# Patient Record
Sex: FEMALE | Race: WHITE | NOT HISPANIC OR LATINO | URBAN - METROPOLITAN AREA
[De-identification: names, ages, dates, MRNs, and addresses within clinical notes are randomized per-mention and may not be internally consistent; named-entity substitution may affect disease eponyms.]

---

## 2023-03-16 ENCOUNTER — NURSING HOME VISIT (OUTPATIENT)
Dept: POST ACUTE CARE | Facility: EXTERNAL LOCATION | Age: 88
End: 2023-03-16
Payer: MEDICARE

## 2023-03-16 ENCOUNTER — NURSING HOME VISIT (OUTPATIENT)
Dept: POST ACUTE CARE | Facility: EXTERNAL LOCATION | Age: 88
End: 2023-03-16

## 2023-03-16 DIAGNOSIS — I10 BENIGN HYPERTENSION: ICD-10-CM

## 2023-03-16 DIAGNOSIS — G40.909 SEIZURE DISORDER (MULTI): ICD-10-CM

## 2023-03-16 DIAGNOSIS — F03.918 DEMENTIA WITH BEHAVIORAL PROBLEM (MULTI): ICD-10-CM

## 2023-03-16 DIAGNOSIS — G47.01 INSOMNIA DUE TO MEDICAL CONDITION: ICD-10-CM

## 2023-03-16 DIAGNOSIS — I10 ESSENTIAL HYPERTENSION, BENIGN: ICD-10-CM

## 2023-03-16 DIAGNOSIS — G47.00 PERSISTENT DISORDER OF INITIATING OR MAINTAINING SLEEP: ICD-10-CM

## 2023-03-16 DIAGNOSIS — G40.89: ICD-10-CM

## 2023-03-16 PROCEDURE — 99308 SBSQ NF CARE LOW MDM 20: CPT | Performed by: FAMILY MEDICINE

## 2023-03-16 NOTE — LETTER
Patient: Bronwyn Freeman  : 3/29/1928    Encounter Date: 2023    SEE SCANNED NOTE    Electronically Signed By: Bonnie Fallon DO   3/21/23  2:55 PM

## 2023-03-16 NOTE — LETTER
Patient: Bronwyn Freeman  : 3/29/1928    Encounter Date: 2023    SEE SCANNED NOTE    Electronically Signed By: Bonnie Fallon DO   3/21/23  2:50 PM

## 2023-05-25 ENCOUNTER — NURSING HOME VISIT (OUTPATIENT)
Dept: POST ACUTE CARE | Facility: EXTERNAL LOCATION | Age: 88
End: 2023-05-25
Payer: MEDICARE

## 2023-05-25 DIAGNOSIS — F03.918 DEMENTIA WITH BEHAVIORAL PROBLEM (MULTI): ICD-10-CM

## 2023-05-25 DIAGNOSIS — I10 ESSENTIAL HYPERTENSION, BENIGN: ICD-10-CM

## 2023-05-25 DIAGNOSIS — G47.00 PERSISTENT DISORDER OF INITIATING OR MAINTAINING SLEEP: ICD-10-CM

## 2023-05-25 PROCEDURE — 99308 SBSQ NF CARE LOW MDM 20: CPT | Performed by: FAMILY MEDICINE

## 2023-05-25 NOTE — LETTER
Patient: Bronwyn Freeman  : 3/29/1928    Encounter Date: 2023    SEE SCANNED NOTE      Electronically Signed By: Bonnie Fallon DO   23  8:57 AM

## 2023-05-30 PROBLEM — G47.00 PERSISTENT DISORDER OF INITIATING OR MAINTAINING SLEEP: Status: ACTIVE | Noted: 2023-05-30

## 2023-05-30 PROBLEM — I10 ESSENTIAL HYPERTENSION, BENIGN: Status: ACTIVE | Noted: 2023-05-30

## 2023-05-30 PROBLEM — F03.918 DEMENTIA WITH BEHAVIORAL PROBLEM (MULTI): Status: ACTIVE | Noted: 2023-05-30

## 2023-06-22 ENCOUNTER — NURSING HOME VISIT (OUTPATIENT)
Dept: POST ACUTE CARE | Facility: EXTERNAL LOCATION | Age: 88
End: 2023-06-22
Payer: MEDICARE

## 2023-06-22 DIAGNOSIS — I10 ESSENTIAL HYPERTENSION, BENIGN: ICD-10-CM

## 2023-06-22 DIAGNOSIS — G47.00 PERSISTENT DISORDER OF INITIATING OR MAINTAINING SLEEP: ICD-10-CM

## 2023-06-22 DIAGNOSIS — F03.918 DEMENTIA WITH BEHAVIORAL PROBLEM (MULTI): ICD-10-CM

## 2023-06-22 PROCEDURE — 99308 SBSQ NF CARE LOW MDM 20: CPT | Performed by: FAMILY MEDICINE

## 2023-06-22 NOTE — LETTER
Patient: Bronwyn Freeman  : 3/29/1928    Encounter Date: 2023    SEE SCANNED NOTE    Electronically Signed By: Bonnie Fallon DO   23  8:34 AM

## 2023-07-20 ENCOUNTER — NURSING HOME VISIT (OUTPATIENT)
Dept: POST ACUTE CARE | Facility: EXTERNAL LOCATION | Age: 88
End: 2023-07-20
Payer: MEDICARE

## 2023-07-20 DIAGNOSIS — F03.918 DEMENTIA WITH BEHAVIORAL PROBLEM (MULTI): ICD-10-CM

## 2023-07-20 PROCEDURE — 99308 SBSQ NF CARE LOW MDM 20: CPT | Performed by: FAMILY MEDICINE

## 2023-07-20 NOTE — LETTER
Patient: Bronwyn Freeman  : 3/29/1928    Encounter Date: 2023    SEE SCANNED NOTE      Electronically Signed By: Bonnie Fallon DO   23  8:12 AM

## 2024-03-29 ENCOUNTER — NURSING HOME VISIT (OUTPATIENT)
Dept: POST ACUTE CARE | Facility: EXTERNAL LOCATION | Age: 89
End: 2024-03-29
Payer: MEDICARE

## 2024-03-29 DIAGNOSIS — F03.918 DEMENTIA WITH BEHAVIORAL PROBLEM (MULTI): ICD-10-CM

## 2024-03-29 DIAGNOSIS — I10 ESSENTIAL HYPERTENSION, BENIGN: ICD-10-CM

## 2024-03-29 PROCEDURE — 99308 SBSQ NF CARE LOW MDM 20: CPT | Performed by: FAMILY MEDICINE

## 2024-03-29 NOTE — LETTER
Patient: Bronwyn Freeman  : 3/29/1928    Encounter Date: 2024    SEE SCANNED NOTE    Electronically Signed By: Bonnie Fallon DO   3/29/24 10:24 AM

## 2024-04-11 ENCOUNTER — NURSING HOME VISIT (OUTPATIENT)
Dept: POST ACUTE CARE | Facility: EXTERNAL LOCATION | Age: 89
End: 2024-04-11
Payer: MEDICARE

## 2024-04-11 DIAGNOSIS — I10 ESSENTIAL HYPERTENSION, BENIGN: ICD-10-CM

## 2024-04-11 DIAGNOSIS — F03.918 DEMENTIA WITH BEHAVIORAL PROBLEM (MULTI): ICD-10-CM

## 2024-04-11 DIAGNOSIS — B02.23 SHINGLES (HERPES ZOSTER) POLYNEUROPATHY: ICD-10-CM

## 2024-04-11 PROCEDURE — 99308 SBSQ NF CARE LOW MDM 20: CPT | Performed by: FAMILY MEDICINE

## 2024-04-11 NOTE — LETTER
Patient: Bronwyn Freeman  : 3/29/1928    Encounter Date: 2024    SEE SCANNED NOTE      Electronically Signed By: Bonnie Fallon DO   24  9:36 AM

## 2024-05-23 ENCOUNTER — NURSING HOME VISIT (OUTPATIENT)
Dept: POST ACUTE CARE | Facility: EXTERNAL LOCATION | Age: 89
End: 2024-05-23
Payer: MEDICARE

## 2024-05-23 DIAGNOSIS — F03.918 DEMENTIA WITH BEHAVIORAL PROBLEM (MULTI): Primary | ICD-10-CM

## 2024-05-23 DIAGNOSIS — I10 ESSENTIAL HYPERTENSION, BENIGN: ICD-10-CM

## 2024-05-23 PROCEDURE — 99308 SBSQ NF CARE LOW MDM 20: CPT | Performed by: FAMILY MEDICINE

## 2024-05-23 NOTE — LETTER
Patient: Bronwyn Freeman  : 3/29/1928    Encounter Date: 2024    SEE SCANNED NOTE      Electronically Signed By: Bonnie Fallon DO   24  9:03 AM

## 2024-06-20 ENCOUNTER — NURSING HOME VISIT (OUTPATIENT)
Dept: POST ACUTE CARE | Facility: EXTERNAL LOCATION | Age: 89
End: 2024-06-20
Payer: MEDICARE

## 2024-06-20 DIAGNOSIS — F03.918 DEMENTIA WITH BEHAVIORAL PROBLEM (MULTI): ICD-10-CM

## 2024-06-20 DIAGNOSIS — I10 ESSENTIAL HYPERTENSION, BENIGN: ICD-10-CM

## 2024-06-20 PROCEDURE — 99308 SBSQ NF CARE LOW MDM 20: CPT | Performed by: FAMILY MEDICINE

## 2024-06-20 NOTE — LETTER
Patient: Bronwyn Freeman  : 3/29/1928    Encounter Date: 2024    SEE SCANNED NOTE      Electronically Signed By: Bonnie Fallon DO   24 11:26 AM

## 2024-07-17 ENCOUNTER — NURSING HOME VISIT (OUTPATIENT)
Dept: POST ACUTE CARE | Facility: EXTERNAL LOCATION | Age: 89
End: 2024-07-17
Payer: MEDICARE

## 2024-07-17 DIAGNOSIS — F03.918 DEMENTIA WITH BEHAVIORAL PROBLEM (MULTI): ICD-10-CM

## 2024-07-17 DIAGNOSIS — I10 ESSENTIAL HYPERTENSION, BENIGN: ICD-10-CM

## 2024-07-17 PROCEDURE — 99308 SBSQ NF CARE LOW MDM 20: CPT | Performed by: FAMILY MEDICINE

## 2024-07-17 NOTE — LETTER
Patient: Bronwyn Freeman  : 3/29/1928    Encounter Date: 2024    SEE SCANNED NOTE      Electronically Signed By: Bonnie Fallon DO   24  9:53 AM

## 2024-08-15 ENCOUNTER — NURSING HOME VISIT (OUTPATIENT)
Dept: POST ACUTE CARE | Facility: EXTERNAL LOCATION | Age: 89
End: 2024-08-15
Payer: MEDICARE

## 2024-08-15 DIAGNOSIS — F03.918 DEMENTIA WITH BEHAVIORAL PROBLEM (MULTI): ICD-10-CM

## 2024-08-15 DIAGNOSIS — I10 ESSENTIAL HYPERTENSION, BENIGN: ICD-10-CM

## 2024-08-15 PROCEDURE — 99310 SBSQ NF CARE HIGH MDM 45: CPT | Performed by: FAMILY MEDICINE

## 2024-08-15 NOTE — LETTER
Patient: Bronwyn Freeman  : 3/29/1928    Encounter Date: 08/15/2024    SEE SCANNED NOTE      Electronically Signed By: Bonnie Fallon DO   24  8:55 AM

## 2024-10-17 PROCEDURE — 99308 SBSQ NF CARE LOW MDM 20: CPT | Performed by: FAMILY MEDICINE

## 2024-11-08 PROCEDURE — 99308 SBSQ NF CARE LOW MDM 20: CPT | Performed by: FAMILY MEDICINE

## 2024-11-09 PROCEDURE — 99308 SBSQ NF CARE LOW MDM 20: CPT | Performed by: FAMILY MEDICINE

## 2024-11-14 PROCEDURE — 99308 SBSQ NF CARE LOW MDM 20: CPT | Performed by: FAMILY MEDICINE

## 2024-11-14 NOTE — PROGRESS NOTES
SPOKE WITH NURSES AT [facility]   PATIENT CONTINUES TO BE CONFUSED /BETTER TODAY PER NURSING   WILL SPEAK WITH HER DAUGHTER ABOUT PLACING HER IN HOSPICE CARE   SHE HAS DEMENTIA AND WORSENING AMS

## 2024-12-05 ENCOUNTER — NURSING HOME VISIT (OUTPATIENT)
Dept: POST ACUTE CARE | Facility: EXTERNAL LOCATION | Age: 89
End: 2024-12-05
Payer: MEDICARE

## 2024-12-05 ENCOUNTER — NURSING HOME VISIT (OUTPATIENT)
Dept: PRIMARY CARE | Facility: CLINIC | Age: 89
End: 2024-12-05
Payer: MEDICARE

## 2024-12-05 DIAGNOSIS — I10 ESSENTIAL HYPERTENSION, BENIGN: ICD-10-CM

## 2024-12-05 DIAGNOSIS — F03.918 DEMENTIA WITH BEHAVIORAL PROBLEM (MULTI): Primary | ICD-10-CM

## 2024-12-05 DIAGNOSIS — G47.00 PERSISTENT DISORDER OF INITIATING OR MAINTAINING SLEEP: ICD-10-CM

## 2024-12-05 PROCEDURE — 99309 SBSQ NF CARE MODERATE MDM 30: CPT | Performed by: FAMILY MEDICINE

## 2024-12-05 ASSESSMENT — PAIN SCALES - GENERAL: PAINLEVEL_OUTOF10: 0-NO PAIN

## 2024-12-05 NOTE — LETTER
Patient: Bronwyn Freeman  : 3/29/1928    Encounter Date: 2024    Visit  Note   Subjective  Bronwyn Freeman is a 89 y.o. female who is being seen at [facility] and evaluated for multiple medical problems. Nursing notes, vital signs, and labs were reviewed in the local facility chart.  No chief complaint on file.     HPI PATIENT IS VERY DEAF  SHE IS SMILING AND SHE RECOGNIZES ME   HAS NO COMPLAINTS   HERE FOR ROUTINE ROUNDS     Objective  /72   Pulse 72   Temp 36.5 °C (97.7 °F) (Oral)   Resp 16   Wt 64.9 kg (143 lb)   SpO2 96%   Physical Exam  Vitals and nursing note reviewed.   Constitutional:       Appearance: Normal appearance.   HENT:      Head: Normocephalic.   Cardiovascular:      Rate and Rhythm: Normal rate and regular rhythm.      Pulses: Normal pulses.      Heart sounds: Normal heart sounds. No murmur heard.     No friction rub. No gallop.   Pulmonary:      Effort: Pulmonary effort is normal. No respiratory distress.      Breath sounds: Normal breath sounds. No wheezing.   Abdominal:      General: There is no distension.      Palpations: Abdomen is soft.      Tenderness: There is no abdominal tenderness.   Musculoskeletal:         General: No deformity.      Comments: VERY SEDENTARY AND AMBULATORY DYSFUNCTION AND DECONDITIONING    Skin:     General: Skin is warm and dry.      Capillary Refill: Capillary refill takes less than 2 seconds.   Neurological:      General: No focal deficit present.      Mental Status: She is alert.      Comments: ORIENTED TO HER PLACE AND SOME FACIAL RECOGNITION      Psychiatric:         Mood and Affect: Mood normal.     Assessment/Plan  Assessment & Plan  Dementia with behavioral problem (Multi)         Essential hypertension, benign         Persistent disorder of initiating or maintaining sleep                Please excuse any errors in grammar or translation related to this dictation. Voice recognition software was utilized to prepare this  document.       Electronically Signed By: Bonnie Fallon DO   12/16/24  1:07 PM

## 2024-12-16 VITALS
SYSTOLIC BLOOD PRESSURE: 122 MMHG | TEMPERATURE: 97.7 F | HEART RATE: 72 BPM | DIASTOLIC BLOOD PRESSURE: 72 MMHG | OXYGEN SATURATION: 96 % | RESPIRATION RATE: 16 BRPM | WEIGHT: 143 LBS

## 2024-12-16 NOTE — PROGRESS NOTES
Visit  Note   Subjective   Bronwyn Freeman is a 89 y.o. female who is being seen at [facility] and evaluated for multiple medical problems. Nursing notes, vital signs, and labs were reviewed in the local facility chart.  No chief complaint on file.     HPI PATIENT IS VERY DEAF  SHE IS SMILING AND SHE RECOGNIZES ME   HAS NO COMPLAINTS   HERE FOR ROUTINE ROUNDS     Objective   /72   Pulse 72   Temp 36.5 °C (97.7 °F) (Oral)   Resp 16   Wt 64.9 kg (143 lb)   SpO2 96%   Physical Exam  Vitals and nursing note reviewed.   Constitutional:       Appearance: Normal appearance.   HENT:      Head: Normocephalic.   Cardiovascular:      Rate and Rhythm: Normal rate and regular rhythm.      Pulses: Normal pulses.      Heart sounds: Normal heart sounds. No murmur heard.     No friction rub. No gallop.   Pulmonary:      Effort: Pulmonary effort is normal. No respiratory distress.      Breath sounds: Normal breath sounds. No wheezing.   Abdominal:      General: There is no distension.      Palpations: Abdomen is soft.      Tenderness: There is no abdominal tenderness.   Musculoskeletal:         General: No deformity.      Comments: VERY SEDENTARY AND AMBULATORY DYSFUNCTION AND DECONDITIONING    Skin:     General: Skin is warm and dry.      Capillary Refill: Capillary refill takes less than 2 seconds.   Neurological:      General: No focal deficit present.      Mental Status: She is alert.      Comments: ORIENTED TO HER PLACE AND SOME FACIAL RECOGNITION      Psychiatric:         Mood and Affect: Mood normal.     Assessment/Plan   Assessment & Plan  Dementia with behavioral problem (Multi)         Essential hypertension, benign         Persistent disorder of initiating or maintaining sleep                Please excuse any errors in grammar or translation related to this dictation. Voice recognition software was utilized to prepare this document.

## 2024-12-31 NOTE — PROGRESS NOTES
Visit  Note   Subjective   Bronwyn Freeman is a 89 y.o. female who is being seen at [facility] and evaluated for multiple medical problems. Nursing notes, vital signs, and labs were reviewed in the local facility chart.  No chief complaint on file.     HPI     Objective   There were no vitals taken for this visit.  Physical Exam  Assessment/Plan   Assessment & Plan  Dementia with behavioral problem (Multi)         Essential hypertension, benign                Please excuse any errors in grammar or translation related to this dictation. Voice recognition software was utilized to prepare this document.

## 2025-01-20 ENCOUNTER — NURSING HOME VISIT (OUTPATIENT)
Dept: PRIMARY CARE | Facility: CLINIC | Age: OVER 89
End: 2025-01-20
Payer: MEDICARE

## 2025-01-20 VITALS
TEMPERATURE: 97.7 F | DIASTOLIC BLOOD PRESSURE: 68 MMHG | SYSTOLIC BLOOD PRESSURE: 120 MMHG | HEART RATE: 68 BPM | WEIGHT: 144 LBS | RESPIRATION RATE: 16 BRPM | OXYGEN SATURATION: 98 %

## 2025-01-20 DIAGNOSIS — F03.918 DEMENTIA WITH BEHAVIORAL PROBLEM (MULTI): Primary | ICD-10-CM

## 2025-01-20 DIAGNOSIS — I10 ESSENTIAL HYPERTENSION, BENIGN: ICD-10-CM

## 2025-01-20 PROCEDURE — 99308 SBSQ NF CARE LOW MDM 20: CPT | Performed by: FAMILY MEDICINE

## 2025-01-20 ASSESSMENT — ENCOUNTER SYMPTOMS
DIARRHEA: 0
HEADACHES: 0
SORE THROAT: 0
UNEXPECTED WEIGHT CHANGE: 0
NERVOUS/ANXIOUS: 1
LIGHT-HEADEDNESS: 0
COUGH: 0
DYSPHORIC MOOD: 0
EYE DISCHARGE: 0
NAUSEA: 0
ABDOMINAL PAIN: 0
VOMITING: 0
BLOOD IN STOOL: 0
DYSURIA: 0
HEMATURIA: 0
SLEEP DISTURBANCE: 0
NUMBNESS: 0
EYE ITCHING: 0
RHINORRHEA: 0
SINUS PRESSURE: 0
WHEEZING: 0
PALPITATIONS: 0
JOINT SWELLING: 0
CONSTIPATION: 0
FEVER: 0
FLANK PAIN: 0
ARTHRALGIAS: 0
SHORTNESS OF BREATH: 0
MYALGIAS: 0
WEAKNESS: 0
ACTIVITY CHANGE: 0
APPETITE CHANGE: 0
DIZZINESS: 0

## 2025-01-20 NOTE — PROGRESS NOTES
Subjective   Patient ID: Bronwyn Freeman is a 89 y.o. female who presents for No chief complaint on file..    HPI PATIENT BEING SEEN AT [facility] FOR HER MONTHLY VISIT   SHE IS FEELING WELL  PATIENT HAS DEMENTIA AND LONGSTANDING HYPERTENSION AND ASHDS     Review of Systems   Constitutional:  Negative for activity change, appetite change, fever and unexpected weight change.   HENT:  Negative for congestion, ear pain, postnasal drip, rhinorrhea, sinus pressure and sore throat.    Eyes:  Negative for discharge, itching and visual disturbance.   Respiratory:  Negative for cough, shortness of breath and wheezing.    Cardiovascular:  Negative for chest pain, palpitations and leg swelling.        ASHDS    Gastrointestinal:  Negative for abdominal pain, blood in stool, constipation, diarrhea, nausea and vomiting.   Endocrine: Negative for cold intolerance, heat intolerance and polyuria.   Genitourinary:  Negative for dysuria, flank pain and hematuria.   Musculoskeletal:  Negative for arthralgias, gait problem, joint swelling and myalgias.   Skin:  Positive for rash.        CHRONIC NECK AND LESIONS LEGS    Allergic/Immunologic: Negative for environmental allergies and food allergies.   Neurological:  Negative for dizziness, syncope, weakness, light-headedness, numbness and headaches.   Psychiatric/Behavioral:  Negative for dysphoric mood and sleep disturbance. The patient is nervous/anxious.         DEMENTIA        Objective   /68   Pulse 68   Temp 36.5 °C (97.7 °F)   Resp 16   Wt 65.3 kg (144 lb)   SpO2 98%     Physical Exam  Vitals and nursing note reviewed.   Constitutional:       Appearance: Normal appearance.   HENT:      Head: Normocephalic.   Cardiovascular:      Rate and Rhythm: Normal rate and regular rhythm.      Pulses: Normal pulses.      Heart sounds: Normal heart sounds. No murmur heard.     No friction rub. No gallop.   Pulmonary:      Effort: Pulmonary effort is normal. No respiratory  distress.      Breath sounds: Normal breath sounds. No wheezing.   Abdominal:      General: Bowel sounds are normal. There is no distension.      Palpations: Abdomen is soft.      Tenderness: There is no abdominal tenderness.   Musculoskeletal:         General: No deformity. Normal range of motion.   Skin:     General: Skin is warm and dry.      Capillary Refill: Capillary refill takes less than 2 seconds.   Neurological:      General: No focal deficit present.      Mental Status: She is alert and oriented to person, place, and time.   Psychiatric:         Mood and Affect: Mood normal.       Assessment/Plan   Problem List Items Addressed This Visit             ICD-10-CM    Dementia with behavioral problem (Multi) - Primary F03.918    Essential hypertension, benign I10

## 2025-01-29 NOTE — PROGRESS NOTES
Visit  Note   Subjective   Bronwyn Freeman is a 89 y.o. female who is being seen at [facility] and evaluated for multiple medical problems. Nursing notes, vital signs, and labs were reviewed in the local facility chart.  No chief complaint on file.     HPI     Objective   There were no vitals taken for this visit.  Physical Exam  Assessment/Plan   Assessment & Plan  Dementia with behavioral problem (Multi)         Essential hypertension, benign                Please excuse any errors in grammar or translation related to this dictation. Voice recognition software was utilized to prepare this document.

## 2025-02-20 ENCOUNTER — NURSING HOME VISIT (OUTPATIENT)
Dept: PRIMARY CARE | Facility: CLINIC | Age: OVER 89
End: 2025-02-20
Payer: MEDICARE

## 2025-02-20 VITALS
DIASTOLIC BLOOD PRESSURE: 68 MMHG | OXYGEN SATURATION: 98 % | WEIGHT: 148 LBS | RESPIRATION RATE: 18 BRPM | HEART RATE: 72 BPM | SYSTOLIC BLOOD PRESSURE: 128 MMHG | TEMPERATURE: 97.8 F

## 2025-02-20 DIAGNOSIS — I10 ESSENTIAL HYPERTENSION, BENIGN: ICD-10-CM

## 2025-02-20 DIAGNOSIS — F03.918 DEMENTIA WITH BEHAVIORAL PROBLEM (MULTI): Primary | ICD-10-CM

## 2025-02-20 DIAGNOSIS — G47.00 PERSISTENT DISORDER OF INITIATING OR MAINTAINING SLEEP: ICD-10-CM

## 2025-02-20 PROCEDURE — 99308 SBSQ NF CARE LOW MDM 20: CPT | Performed by: FAMILY MEDICINE

## 2025-02-20 ASSESSMENT — PAIN SCALES - GENERAL: PAINLEVEL_OUTOF10: 0-NO PAIN

## 2025-02-21 DIAGNOSIS — M15.0 PRIMARY OSTEOARTHRITIS INVOLVING MULTIPLE JOINTS: Primary | ICD-10-CM

## 2025-02-21 NOTE — PROGRESS NOTES
"Visit  Note   Subjective   Bronwyn Freeman is a 89 y.o. female who is being seen at [facility] and evaluated for multiple medical problems. Nursing notes, vital signs, and labs were reviewed in the local facility chart.  No chief complaint on file.     HPI PATIENT SEEN IN THE [facility] OF THE Facility   She  IS HAPPY TO SEE ME \"ITS BEEN A LONG TIME\"  ROUNDS ARE MADE MONTHLY   PRIOR RECENTLY PATIENT WAS UROSEPTIC AND VERY CONFUSED   . THIS DID RESOLVE ALTHOUGH EVEN LAST MONTH SHE WAS IN A WEAKENED STATE     Objective   /68   Pulse 72   Temp 36.6 °C (97.8 °F)   Resp 18   Wt 67.1 kg (148 lb)   SpO2 98%   Physical Exam  Vitals and nursing note reviewed.   Constitutional:       Appearance: Normal appearance.   HENT:      Head: Normocephalic.      Mouth/Throat:      Mouth: Mucous membranes are moist.   Cardiovascular:      Rate and Rhythm: Normal rate and regular rhythm.      Pulses: Normal pulses.      Heart sounds: Normal heart sounds. No murmur heard.     No friction rub. No gallop.   Pulmonary:      Effort: Pulmonary effort is normal. No respiratory distress.      Breath sounds: Normal breath sounds. No wheezing.   Abdominal:      General: Bowel sounds are normal. There is no distension.      Palpations: Abdomen is soft.      Tenderness: There is no abdominal tenderness.   Musculoskeletal:         General: No deformity. Normal range of motion.   Skin:     General: Skin is warm and dry.      Capillary Refill: Capillary refill takes less than 2 seconds.   Neurological:      General: No focal deficit present.      Mental Status: She is alert. Mental status is at baseline.      Motor: Weakness present.   Psychiatric:         Mood and Affect: Mood normal.     Assessment/Plan   Assessment & Plan  Dementia with behavioral problem (Multi)         Persistent disorder of initiating or maintaining sleep         Essential hypertension, benign                Please excuse any errors in grammar or " translation related to this dictation. Voice recognition software was utilized to prepare this document.

## 2025-02-24 RX ORDER — TRAMADOL HYDROCHLORIDE 50 MG/1
50 TABLET ORAL EVERY 8 HOURS PRN
Qty: 10 TABLET | Refills: 0 | OUTPATIENT
Start: 2025-02-24 | End: 2025-03-01

## 2025-03-07 ENCOUNTER — NURSING HOME VISIT (OUTPATIENT)
Dept: POST ACUTE CARE | Facility: EXTERNAL LOCATION | Age: OVER 89
End: 2025-03-07
Payer: MEDICARE

## 2025-03-07 VITALS
OXYGEN SATURATION: 98 % | DIASTOLIC BLOOD PRESSURE: 68 MMHG | SYSTOLIC BLOOD PRESSURE: 124 MMHG | RESPIRATION RATE: 18 BRPM | WEIGHT: 148 LBS | HEART RATE: 74 BPM | TEMPERATURE: 97.9 F

## 2025-03-07 DIAGNOSIS — F03.918 DEMENTIA WITH BEHAVIORAL PROBLEM (MULTI): Primary | ICD-10-CM

## 2025-03-07 DIAGNOSIS — I10 ESSENTIAL HYPERTENSION, BENIGN: ICD-10-CM

## 2025-03-07 PROCEDURE — 99308 SBSQ NF CARE LOW MDM 20: CPT | Performed by: FAMILY MEDICINE

## 2025-03-07 NOTE — PROGRESS NOTES
Visit  Note   Subjective   Bronwyn Freeman is a 89 y.o. female who is being seen at [facility] and evaluated for multiple medical problems. Nursing notes, vital signs, and labs were reviewed in the local facility chart.  No chief complaint on file.     HPI NURSES SAY THAT SHE IS FEELING WELL. SHE IS IN HOSPICE CARE NOW. REVIEWED THE CHANGES IN HER MEDICATIONS AND SIGNED ON CHANGES IN ANXIETY MEDICINES AND HER PAIN MANAGEMENT.   SHE IS ASLEEP SOUNDLY AND I AM UNABLE TO AROUSE HER.     Objective   /68   Pulse 74   Temp 36.6 °C (97.9 °F)   Resp 18   Wt 67.1 kg (148 lb)   SpO2 98%   Physical Exam She is in her room in her recliner chair sound asleep.  Her color is good.  She is breathing regularly without abnormal breath sounds.  Her legs show no signs of edema.    Assessment/Plan continue with hospice care and all meds and measures.  Assessment & Plan  Dementia with behavioral problem (Multi)         Essential hypertension, benign                Please excuse any errors in grammar or translation related to this dictation. Voice recognition software was utilized to prepare this document.

## 2025-03-07 NOTE — LETTER
Patient: Bronwyn Freeman  : 3/29/1928    Encounter Date: 2025    Visit  Note   Subjective  Bronwyn Freeman is a 89 y.o. female who is being seen at [facility] and evaluated for multiple medical problems. Nursing notes, vital signs, and labs were reviewed in the local facility chart.  No chief complaint on file.     HPI NURSES SAY THAT SHE IS FEELING WELL. SHE IS IN HOSPICE CARE NOW. REVIEWED THE CHANGES IN HER MEDICATIONS AND SIGNED ON CHANGES IN ANXIETY MEDICINES AND HER PAIN MANAGEMENT.   SHE IS ASLEEP SOUNDLY AND I AM UNABLE TO AROUSE HER.     Objective  /68   Pulse 74   Temp 36.6 °C (97.9 °F)   Resp 18   Wt 67.1 kg (148 lb)   SpO2 98%   Physical Exam She is in her room in her recliner chair sound asleep.  Her color is good.  She is breathing regularly without abnormal breath sounds.  Her legs show no signs of edema.    Assessment/Plancontinue with hospice care and all meds and measures.  Assessment & Plan  Dementia with behavioral problem (Multi)         Essential hypertension, benign                Please excuse any errors in grammar or translation related to this dictation. Voice recognition software was utilized to prepare this document.     Electronically Signed By: Bonnie Fallon DO   3/7/25  7:11 AM

## 2025-04-07 DIAGNOSIS — F03.918 DEMENTIA WITH BEHAVIORAL PROBLEM (MULTI): Primary | ICD-10-CM

## 2025-04-07 RX ORDER — LORAZEPAM 0.5 MG/1
0.5 TABLET ORAL 2 TIMES DAILY PRN
Qty: 30 TABLET | Refills: 3 | Status: SHIPPED | OUTPATIENT
Start: 2025-04-07 | End: 2025-12-03

## 2025-04-21 ENCOUNTER — NURSING HOME VISIT (OUTPATIENT)
Dept: POST ACUTE CARE | Facility: EXTERNAL LOCATION | Age: OVER 89
End: 2025-04-21
Payer: MEDICARE

## 2025-04-21 VITALS
WEIGHT: 144 LBS | TEMPERATURE: 97.8 F | SYSTOLIC BLOOD PRESSURE: 120 MMHG | HEART RATE: 72 BPM | DIASTOLIC BLOOD PRESSURE: 68 MMHG | OXYGEN SATURATION: 98 %

## 2025-04-21 DIAGNOSIS — F03.918 DEMENTIA WITH BEHAVIORAL PROBLEM (MULTI): Primary | ICD-10-CM

## 2025-04-21 DIAGNOSIS — I10 ESSENTIAL HYPERTENSION, BENIGN: ICD-10-CM

## 2025-04-21 PROCEDURE — 99308 SBSQ NF CARE LOW MDM 20: CPT | Performed by: FAMILY MEDICINE

## 2025-04-21 NOTE — LETTER
"Patient: Bronwyn Freeman  : 3/29/1928    Encounter Date: 2025    Visit  Note   Subjective  Bronwyn Freeman is a 89 y.o. female who is being seen at [facility] and evaluated for multiple medical problems. Nursing notes, vital signs, and labs were reviewed in the local facility chart.  PATIENT SEEN IN HER ROOM   SHE IS DOZING IN HER CHAIR/DRESSED  AND WHEN AWAKENED APPEARS TO BE HER NORMAL SELF. \"I CAN'T HEAR!\" SHE SAYS   HPI   PATIENT IS STABLE GERIATRIC PATIENT AT [facility] FOR MANY YEARS.  Objective  /68   Pulse 72   Temp 36.6 °C (97.8 °F)   Wt 65.3 kg (144 lb)   SpO2 98%   Physical Exam  Vitals and nursing note reviewed.   Constitutional:       Appearance: Normal appearance.   HENT:      Head: Normocephalic.   Cardiovascular:      Rate and Rhythm: Normal rate and regular rhythm.      Pulses: Normal pulses.      Heart sounds: Normal heart sounds. No murmur heard.     No friction rub. No gallop.   Pulmonary:      Effort: Pulmonary effort is normal. No respiratory distress.      Breath sounds: Normal breath sounds. No wheezing.   Abdominal:      General: Bowel sounds are normal. There is no distension.      Palpations: Abdomen is soft.      Tenderness: There is no abdominal tenderness.   Musculoskeletal:         General: No deformity. Normal range of motion.   Skin:     General: Skin is warm and dry.      Capillary Refill: Capillary refill takes less than 2 seconds.   Neurological:      General: No focal deficit present.      Mental Status: She is alert. Mental status is at baseline.   Psychiatric:         Mood and Affect: Mood normal.       Assessment/Plan  Assessment & Plan  Dementia with behavioral problem (Multi)         Essential hypertension, benign                Please excuse any errors in grammar or translation related to this dictation. Voice recognition software was utilized to prepare this document.     Electronically Signed By: Bonnie Fallon DO   25  " 7:51 AM

## 2025-04-21 NOTE — PROGRESS NOTES
"Visit  Note   Subjective   Bronwyn Freeman is a 89 y.o. female who is being seen at [facility] and evaluated for multiple medical problems. Nursing notes, vital signs, and labs were reviewed in the local facility chart.  PATIENT SEEN IN HER ROOM   SHE IS DOZING IN HER CHAIR/DRESSED  AND WHEN AWAKENED APPEARS TO BE HER NORMAL SELF. \"I CAN'T HEAR!\" SHE SAYS   HPI   PATIENT IS STABLE GERIATRIC PATIENT AT [facility] FOR MANY YEARS.  Objective   /68   Pulse 72   Temp 36.6 °C (97.8 °F)   Wt 65.3 kg (144 lb)   SpO2 98%   Physical Exam  Vitals and nursing note reviewed.   Constitutional:       Appearance: Normal appearance.   HENT:      Head: Normocephalic.   Cardiovascular:      Rate and Rhythm: Normal rate and regular rhythm.      Pulses: Normal pulses.      Heart sounds: Normal heart sounds. No murmur heard.     No friction rub. No gallop.   Pulmonary:      Effort: Pulmonary effort is normal. No respiratory distress.      Breath sounds: Normal breath sounds. No wheezing.   Abdominal:      General: Bowel sounds are normal. There is no distension.      Palpations: Abdomen is soft.      Tenderness: There is no abdominal tenderness.   Musculoskeletal:         General: No deformity. Normal range of motion.   Skin:     General: Skin is warm and dry.      Capillary Refill: Capillary refill takes less than 2 seconds.   Neurological:      General: No focal deficit present.      Mental Status: She is alert. Mental status is at baseline.   Psychiatric:         Mood and Affect: Mood normal.       Assessment/Plan   Assessment & Plan  Dementia with behavioral problem (Multi)         Essential hypertension, benign                Please excuse any errors in grammar or translation related to this dictation. Voice recognition software was utilized to prepare this document.   "

## 2025-05-03 ENCOUNTER — NURSING HOME VISIT (OUTPATIENT)
Dept: POST ACUTE CARE | Facility: EXTERNAL LOCATION | Age: OVER 89
End: 2025-05-03
Payer: MEDICARE

## 2025-05-03 DIAGNOSIS — I10 ESSENTIAL HYPERTENSION, BENIGN: ICD-10-CM

## 2025-05-03 DIAGNOSIS — F03.918 DEMENTIA WITH BEHAVIORAL PROBLEM (MULTI): Primary | ICD-10-CM

## 2025-05-03 PROCEDURE — 99308 SBSQ NF CARE LOW MDM 20: CPT | Performed by: FAMILY MEDICINE

## 2025-05-03 NOTE — LETTER
"Patient: Bronwyn Freeman  : 3/29/1928    Encounter Date: 2025    Visit  Note   Subjective  Bronwyn Freeman is a 89 y.o. female who is being seen at [facility] and evaluated for multiple medical problems. Nursing notes, vital signs, and labs were reviewed in the local facility chart.  MONTHLY VISIT /NO COMPLAINTS  (PATIENT SATES THAT \" I CAN SEE AND I CAN'T HEAR\" \"HOW ARE YOU, DR WALDEN?\"/ASLEEP IN HER CHAIR WHEN ARRIVE )     HPI DOING WELL  IN HOSPICE CARE NOW     Objective  /74   Pulse 72   Temp 36.7 °C (98.1 °F)   Resp 18   SpO2 95%       Physical Exam  Vitals and nursing note reviewed.   Constitutional:       Appearance: Normal appearance.      Comments: SHE IS DEAF AND POOR VISION    HENT:      Head: Normocephalic.   Cardiovascular:      Rate and Rhythm: Normal rate and regular rhythm.      Pulses: Normal pulses.      Heart sounds: Normal heart sounds. No murmur heard.     No friction rub. No gallop.   Pulmonary:      Effort: Pulmonary effort is normal. No respiratory distress.      Breath sounds: Normal breath sounds. No wheezing.   Abdominal:      General: There is no distension.      Palpations: Abdomen is soft.      Tenderness: There is no abdominal tenderness.   Musculoskeletal:         General: Deformity present. Normal range of motion.      Comments: KYPHOSIS AND IM SURE THIS IS SOME OF THE HYPOXIA THAT IS FOUND /LUNG RESTRICTION.  HER COLOR IS PINK    Skin:     General: Skin is warm and dry.      Capillary Refill: Capillary refill takes less than 2 seconds.   Neurological:      General: No focal deficit present.      Mental Status: She is alert and oriented to person, place, and time.   Psychiatric:         Mood and Affect: Mood normal.       Assessment/Plan  Assessment & Plan  Dementia with behavioral problem (Multi)         Essential hypertension, benign                Please excuse any errors in grammar or translation related to this dictation. Voice recognition software " was utilized to prepare this document.     Electronically Signed By: Bonnie Fallon DO   5/5/25 12:53 PM

## 2025-05-05 VITALS
OXYGEN SATURATION: 95 % | SYSTOLIC BLOOD PRESSURE: 124 MMHG | DIASTOLIC BLOOD PRESSURE: 74 MMHG | HEART RATE: 72 BPM | RESPIRATION RATE: 18 BRPM | TEMPERATURE: 98.1 F

## 2025-05-05 NOTE — PROGRESS NOTES
"Visit  Note   Subjective   Bronwyn Freeman is a 89 y.o. female who is being seen at [facility] and evaluated for multiple medical problems. Nursing notes, vital signs, and labs were reviewed in the local facility chart.  MONTHLY VISIT /NO COMPLAINTS  (PATIENT SATES THAT \" I CAN SEE AND I CAN'T HEAR\" \"HOW ARE YOU, DR WALDEN?\"/ASLEEP IN HER CHAIR WHEN ARRIVE )     HPI DOING WELL  IN HOSPICE CARE NOW     Objective   /74   Pulse 72   Temp 36.7 °C (98.1 °F)   Resp 18   SpO2 95%       Physical Exam  Vitals and nursing note reviewed.   Constitutional:       Appearance: Normal appearance.      Comments: SHE IS DEAF AND POOR VISION    HENT:      Head: Normocephalic.   Cardiovascular:      Rate and Rhythm: Normal rate and regular rhythm.      Pulses: Normal pulses.      Heart sounds: Normal heart sounds. No murmur heard.     No friction rub. No gallop.   Pulmonary:      Effort: Pulmonary effort is normal. No respiratory distress.      Breath sounds: Normal breath sounds. No wheezing.   Abdominal:      General: There is no distension.      Palpations: Abdomen is soft.      Tenderness: There is no abdominal tenderness.   Musculoskeletal:         General: Deformity present. Normal range of motion.      Comments: KYPHOSIS AND IM SURE THIS IS SOME OF THE HYPOXIA THAT IS FOUND /LUNG RESTRICTION.  HER COLOR IS PINK    Skin:     General: Skin is warm and dry.      Capillary Refill: Capillary refill takes less than 2 seconds.   Neurological:      General: No focal deficit present.      Mental Status: She is alert and oriented to person, place, and time.   Psychiatric:         Mood and Affect: Mood normal.       Assessment/Plan   Assessment & Plan  Dementia with behavioral problem (Multi)         Essential hypertension, benign                Please excuse any errors in grammar or translation related to this dictation. Voice recognition software was utilized to prepare this document.   "

## 2025-06-30 ENCOUNTER — NURSING HOME VISIT (OUTPATIENT)
Dept: POST ACUTE CARE | Facility: EXTERNAL LOCATION | Age: OVER 89
End: 2025-06-30
Payer: MEDICARE

## 2025-06-30 VITALS
OXYGEN SATURATION: 97 % | WEIGHT: 147 LBS | DIASTOLIC BLOOD PRESSURE: 68 MMHG | HEART RATE: 88 BPM | TEMPERATURE: 98 F | SYSTOLIC BLOOD PRESSURE: 124 MMHG | RESPIRATION RATE: 18 BRPM

## 2025-06-30 DIAGNOSIS — G40.909 SEIZURE DISORDER (MULTI): ICD-10-CM

## 2025-06-30 DIAGNOSIS — F03.918 DEMENTIA WITH BEHAVIORAL PROBLEM (MULTI): Primary | ICD-10-CM

## 2025-06-30 DIAGNOSIS — I10 ESSENTIAL HYPERTENSION, BENIGN: ICD-10-CM

## 2025-06-30 PROCEDURE — 99308 SBSQ NF CARE LOW MDM 20: CPT | Performed by: FAMILY MEDICINE

## 2025-06-30 ASSESSMENT — ENCOUNTER SYMPTOMS
FEVER: 0
DIZZINESS: 0
ARTHRALGIAS: 0
APPETITE CHANGE: 1
SINUS PRESSURE: 0
ACTIVITY CHANGE: 1
DIARRHEA: 0
EYE ITCHING: 0
BLOOD IN STOOL: 0
SLEEP DISTURBANCE: 0
EYE DISCHARGE: 0
VOMITING: 0
ABDOMINAL PAIN: 0
CONSTIPATION: 0
NUMBNESS: 0
PALPITATIONS: 0
NERVOUS/ANXIOUS: 0
LIGHT-HEADEDNESS: 0
HEADACHES: 0
WEAKNESS: 0
WHEEZING: 0
COUGH: 0
DYSURIA: 0
DECREASED CONCENTRATION: 1
NAUSEA: 0
SHORTNESS OF BREATH: 0
MYALGIAS: 0
FLANK PAIN: 0
CONFUSION: 1
DYSPHORIC MOOD: 0
SORE THROAT: 0
UNEXPECTED WEIGHT CHANGE: 0
RHINORRHEA: 0
HEMATURIA: 0
JOINT SWELLING: 0

## 2025-06-30 ASSESSMENT — PAIN SCALES - GENERAL: PAINLEVEL_OUTOF10: 2

## 2025-06-30 NOTE — LETTER
Patient: Bronwyn Freeman  : 3/29/1928    Encounter Date: 2025    Subjective  Patient ID: Bronwyn Freeman is a 89 y.o. female who is long term resident and presents for initial visit for long term nursing.    Rash  Pertinent negatives include no congestion, cough, diarrhea, fever, rhinorrhea, shortness of breath, sore throat or vomiting.    BRONWYN IS IN HOSPICE CARE   RECENTLY HAD STROKE   TODAY NURSES CALLED THAT SHE HAD A NEW RASH.  I CAME FOR REGULAR ROUNDS AND THIS IS THE SAME ECZEMATOUS RASH SHE HAS HAD THAT I HAVE SPOKE TO FAMILY ABOUT   THEY HAVE DECLINED DERMATOLOGIC EVALUATION   I DO NOT BELIEVE THAT IT IS SHINGLES     Review of Systems   Constitutional:  Positive for activity change and appetite change. Negative for fever and unexpected weight change.   HENT:  Positive for hearing loss. Negative for congestion, ear pain, postnasal drip, rhinorrhea, sinus pressure and sore throat.    Eyes:  Positive for visual disturbance. Negative for discharge and itching.        POOR VISION    Respiratory:  Negative for cough, shortness of breath and wheezing.    Cardiovascular:  Negative for chest pain, palpitations and leg swelling.   Gastrointestinal:  Negative for abdominal pain, blood in stool, constipation, diarrhea, nausea and vomiting.   Endocrine: Negative for cold intolerance, heat intolerance and polyuria.   Genitourinary:  Negative for dysuria, flank pain and hematuria.   Musculoskeletal:  Negative for arthralgias, gait problem, joint swelling and myalgias.   Skin:  Positive for rash.   Allergic/Immunologic: Negative for environmental allergies and food allergies.   Neurological:  Negative for dizziness, syncope, weakness, light-headedness, numbness and headaches.   Psychiatric/Behavioral:  Positive for confusion and decreased concentration. Negative for dysphoric mood and sleep disturbance. The patient is not nervous/anxious.        Objective  /68   Pulse 88   Temp 36.7 °C (98 °F)    Resp 18   Wt 66.7 kg (147 lb)   SpO2 97%     Physical Exam  Vitals and nursing note reviewed.   Constitutional:       Appearance: Normal appearance.   HENT:      Head: Normocephalic.   Cardiovascular:      Rate and Rhythm: Normal rate and regular rhythm.      Pulses: Normal pulses.      Heart sounds: Normal heart sounds. No murmur heard.     No friction rub. No gallop.   Pulmonary:      Effort: Pulmonary effort is normal. No respiratory distress.      Breath sounds: Normal breath sounds. No wheezing.   Abdominal:      General: There is no distension.      Palpations: Abdomen is soft.      Tenderness: There is no abdominal tenderness.   Musculoskeletal:         General: No deformity. Normal range of motion.   Skin:     General: Skin is warm and dry.      Capillary Refill: Capillary refill takes less than 2 seconds.      Findings: Rash present.      Comments: HEAT RASH ON BACK FROM [place] AND THE SAME ECZEMATOUS CHANGES RIGHT NECK FOR MANY YEARS THAT SOMEWHAT RESPOND TO STEROID    Neurological:      General: No focal deficit present.      Mental Status: She is alert and oriented to person, place, and time.   Psychiatric:      Comments: PATIENT REPEATS I WANT TO DIE  WHY AM I HERE ?         Assessment/Plan  Diagnoses and all orders for this visit:  Dementia with behavioral problem (Multi)  Essential hypertension, benign     Goals    None         Electronically Signed By: Bonnie Fallon DO   6/30/25  4:01 PM

## 2025-06-30 NOTE — PROGRESS NOTES
Subjective   Patient ID: Bronwyn Freeman is a 89 y.o. female who is long term resident and presents for initial visit for long term nursing.    Rash  Pertinent negatives include no congestion, cough, diarrhea, fever, rhinorrhea, shortness of breath, sore throat or vomiting.    BRONWYN IS IN HOSPICE CARE   RECENTLY HAD STROKE   TODAY NURSES CALLED THAT SHE HAD A NEW RASH.  I CAME FOR REGULAR ROUNDS AND THIS IS THE SAME ECZEMATOUS RASH SHE HAS HAD THAT I HAVE SPOKE TO FAMILY ABOUT   THEY HAVE DECLINED DERMATOLOGIC EVALUATION   I DO NOT BELIEVE THAT IT IS SHINGLES     Review of Systems   Constitutional:  Positive for activity change and appetite change. Negative for fever and unexpected weight change.   HENT:  Positive for hearing loss. Negative for congestion, ear pain, postnasal drip, rhinorrhea, sinus pressure and sore throat.    Eyes:  Positive for visual disturbance. Negative for discharge and itching.        POOR VISION    Respiratory:  Negative for cough, shortness of breath and wheezing.    Cardiovascular:  Negative for chest pain, palpitations and leg swelling.   Gastrointestinal:  Negative for abdominal pain, blood in stool, constipation, diarrhea, nausea and vomiting.   Endocrine: Negative for cold intolerance, heat intolerance and polyuria.   Genitourinary:  Negative for dysuria, flank pain and hematuria.   Musculoskeletal:  Negative for arthralgias, gait problem, joint swelling and myalgias.   Skin:  Positive for rash.   Allergic/Immunologic: Negative for environmental allergies and food allergies.   Neurological:  Negative for dizziness, syncope, weakness, light-headedness, numbness and headaches.   Psychiatric/Behavioral:  Positive for confusion and decreased concentration. Negative for dysphoric mood and sleep disturbance. The patient is not nervous/anxious.        Objective   /68   Pulse 88   Temp 36.7 °C (98 °F)   Resp 18   Wt 66.7 kg (147 lb)   SpO2 97%     Physical Exam  Vitals and  nursing note reviewed.   Constitutional:       Appearance: Normal appearance.   HENT:      Head: Normocephalic.   Cardiovascular:      Rate and Rhythm: Normal rate and regular rhythm.      Pulses: Normal pulses.      Heart sounds: Normal heart sounds. No murmur heard.     No friction rub. No gallop.   Pulmonary:      Effort: Pulmonary effort is normal. No respiratory distress.      Breath sounds: Normal breath sounds. No wheezing.   Abdominal:      General: There is no distension.      Palpations: Abdomen is soft.      Tenderness: There is no abdominal tenderness.   Musculoskeletal:         General: No deformity. Normal range of motion.   Skin:     General: Skin is warm and dry.      Capillary Refill: Capillary refill takes less than 2 seconds.      Findings: Rash present.      Comments: HEAT RASH ON BACK FROM [place] AND THE SAME ECZEMATOUS CHANGES RIGHT NECK FOR MANY YEARS THAT SOMEWHAT RESPOND TO STEROID    Neurological:      General: No focal deficit present.      Mental Status: She is alert and oriented to person, place, and time.   Psychiatric:      Comments: PATIENT REPEATS I WANT TO DIE  WHY AM I HERE ?         Assessment/Plan   Diagnoses and all orders for this visit:  Dementia with behavioral problem (Multi)  Essential hypertension, benign     Goals    None

## 2025-07-28 ENCOUNTER — NURSING HOME VISIT (OUTPATIENT)
Dept: PRIMARY CARE | Facility: CLINIC | Age: OVER 89
End: 2025-07-28
Payer: MEDICARE

## 2025-07-28 DIAGNOSIS — I10 ESSENTIAL HYPERTENSION, BENIGN: ICD-10-CM

## 2025-07-28 DIAGNOSIS — F03.918 DEMENTIA WITH BEHAVIORAL PROBLEM (MULTI): Primary | ICD-10-CM

## 2025-07-28 NOTE — PROGRESS NOTES
Visit  Note   Subjective   Bronwyn Freeman is a 89 y.o. female who is being seen at [facility] and evaluated for multiple medical problems. Nursing notes, vital signs, and labs were reviewed in the local facility chart.  SHE IS VERY CONFUSED TODAY AND DOES NOT RECOGNIZE ME   HPI   ELDERLY AND DEAF AND POOR VISION. HAS NPH FOR MANY YEARS AND SHUT   WORSENING DEMENTIA NOW 10 YEARS   Objective   There were no vitals taken for this visit.  Physical Exam  Vitals and nursing note reviewed.   Constitutional:       Appearance: Normal appearance.   HENT:      Head: Normocephalic.     Cardiovascular:      Rate and Rhythm: Normal rate and regular rhythm.      Pulses: Normal pulses.      Heart sounds: Normal heart sounds. No murmur heard.     No friction rub. No gallop.   Pulmonary:      Effort: Pulmonary effort is normal. No respiratory distress.      Breath sounds: Normal breath sounds. No wheezing.   Abdominal:      General: Bowel sounds are normal. There is no distension.      Palpations: Abdomen is soft.      Tenderness: There is no abdominal tenderness.     Musculoskeletal:         General: Deformity present.      Right lower leg: No edema.      Left lower leg: No edema.      Comments: GLOBAL DECONDITIONING      Skin:     General: Skin is warm and dry.      Capillary Refill: Capillary refill takes less than 2 seconds.     Neurological:      General: No focal deficit present.      Mental Status: She is alert. She is disoriented.     Psychiatric:      Comments: UPSET AND CRYING OUT   SHE IS IN HOSPICE CARE NOW        Assessment/Plan   Assessment & Plan     CONTINUE WITH HOSPICE CARE       Please excuse any errors in grammar or translation related to this dictation. Voice recognition software was utilized to prepare this document.